# Patient Record
Sex: MALE | Race: BLACK OR AFRICAN AMERICAN | NOT HISPANIC OR LATINO | Employment: UNEMPLOYED | URBAN - METROPOLITAN AREA
[De-identification: names, ages, dates, MRNs, and addresses within clinical notes are randomized per-mention and may not be internally consistent; named-entity substitution may affect disease eponyms.]

---
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
## 2021-06-25 ENCOUNTER — OFFICE VISIT (OUTPATIENT)
Dept: FAMILY MEDICINE CLINIC | Facility: CLINIC | Age: 21
End: 2021-06-25

## 2021-06-25 VITALS
DIASTOLIC BLOOD PRESSURE: 70 MMHG | SYSTOLIC BLOOD PRESSURE: 132 MMHG | OXYGEN SATURATION: 100 % | WEIGHT: 267 LBS | HEIGHT: 71 IN | HEART RATE: 85 BPM | BODY MASS INDEX: 37.38 KG/M2

## 2021-06-25 DIAGNOSIS — J45.909 MILD ASTHMA WITHOUT COMPLICATION, UNSPECIFIED WHETHER PERSISTENT: ICD-10-CM

## 2021-06-25 DIAGNOSIS — Z13.29 SCREENING FOR THYROID DISORDER: ICD-10-CM

## 2021-06-25 DIAGNOSIS — T78.40XA ALLERGY, INITIAL ENCOUNTER: ICD-10-CM

## 2021-06-25 DIAGNOSIS — H61.23 BILATERAL IMPACTED CERUMEN: ICD-10-CM

## 2021-06-25 DIAGNOSIS — Z01.89 ROUTINE LAB DRAW: Primary | ICD-10-CM

## 2021-06-25 DIAGNOSIS — F90.9 ATTENTION DEFICIT HYPERACTIVITY DISORDER (ADHD), UNSPECIFIED ADHD TYPE: ICD-10-CM

## 2021-06-25 PROBLEM — T14.8XXA BROKEN BONES: Status: ACTIVE | Noted: 2021-06-25

## 2021-06-25 PROCEDURE — 99203 OFFICE O/P NEW LOW 30 MIN: CPT | Performed by: NURSE PRACTITIONER

## 2021-06-25 RX ORDER — MONTELUKAST SODIUM 10 MG/1
10 TABLET ORAL DAILY
Qty: 90 TABLET | Refills: 1 | Status: SHIPPED | OUTPATIENT
Start: 2021-06-25 | End: 2021-12-30 | Stop reason: SDUPTHER

## 2021-06-25 RX ORDER — CETIRIZINE HYDROCHLORIDE 10 MG/1
10 TABLET ORAL DAILY
COMMUNITY
End: 2023-01-05 | Stop reason: SDUPTHER

## 2021-06-25 RX ORDER — BUDESONIDE 1 MG/2ML
1 INHALANT ORAL
COMMUNITY
End: 2021-07-08 | Stop reason: SDUPTHER

## 2021-06-25 RX ORDER — ACETAMINOPHEN 500 MG
500 TABLET ORAL EVERY 6 HOURS PRN
COMMUNITY

## 2021-06-25 RX ORDER — TRAZODONE HYDROCHLORIDE 50 MG/1
50 TABLET ORAL
COMMUNITY
Start: 2021-05-25 | End: 2021-09-29 | Stop reason: SDUPTHER

## 2021-06-25 RX ORDER — DIPHENHYDRAMINE HCL 25 MG
25 CAPSULE ORAL NIGHTLY PRN
COMMUNITY

## 2021-06-25 RX ORDER — MONTELUKAST SODIUM 10 MG/1
10 TABLET ORAL DAILY
COMMUNITY
Start: 2021-05-22 | End: 2021-06-25 | Stop reason: SDUPTHER

## 2021-06-25 RX ORDER — METHYLPHENIDATE HYDROCHLORIDE 36 MG/1
72 TABLET ORAL EVERY MORNING
COMMUNITY
Start: 2021-05-25

## 2021-06-25 RX ORDER — BISMUTH SUBSALICYLATE 262 MG/1
524 TABLET, CHEWABLE ORAL DAILY PRN
COMMUNITY

## 2021-06-25 NOTE — PROGRESS NOTES
"Chief Complaint  Establish Care (New Patient)     Patient says he was patient of . States he has no cc just need medications refilled.     He has been seeing Kenneth Brooke for the Concerta. He was dx w/ADHD as a child. States his concentration and focus are good on the current dose. States he has been on the same dose for years.     seasoal allergies-under good control w/zyrtec and singulair.     Asthma-under good control w/pulmicort and singulair. He does have an albuterol inhaler but has never had to use it.         Subjective          Refugio Alvarez presents to [facility]  History of Present Illness    He  has no past medical history on file.     Objective   Vital Signs:   /70 (BP Location: Right arm, Patient Position: Sitting, Cuff Size: Adult)   Pulse 85   Ht 180.3 cm (71\")   Wt 121 kg (267 lb)   SpO2 100%   BMI 37.24 kg/m²     Physical Exam  Constitutional:       Appearance: Normal appearance.   HENT:      Head: Normocephalic and atraumatic.      Right Ear: There is impacted cerumen.      Left Ear: There is impacted cerumen.   Neck:      Thyroid: No thyroid mass, thyromegaly or thyroid tenderness.   Cardiovascular:      Rate and Rhythm: Normal rate and regular rhythm.      Pulses: Normal pulses.      Heart sounds: Normal heart sounds.   Pulmonary:      Effort: Pulmonary effort is normal.      Breath sounds: Normal breath sounds.   Skin:     General: Skin is warm and dry.   Neurological:      Mental Status: He is alert and oriented to person, place, and time.   Psychiatric:         Mood and Affect: Mood normal.         Behavior: Behavior normal.        Result Review :{Labs  Result Review  Imaging  Med Tab  Media  Procedures          Assessment and Plan    Diagnoses and all orders for this visit:    1. Routine lab draw (Primary)  -     CBC & Differential; Future  -     Lipid Panel; Future  -     Comprehensive Metabolic Panel; Future    2. Screening for " thyroid disorder  -     TSH; Future    3. Attention deficit hyperactivity disorder (ADHD), unspecified ADHD type    4. Allergy, initial encounter  -     montelukast (SINGULAIR) 10 MG tablet; Take 1 tablet by mouth Daily.  Dispense: 90 tablet; Refill: 1    5. Mild asthma without complication, unspecified whether persistent    6. Bilateral impacted cerumen  -     Ear Cerumen Removal        Follow Up   Return in about 6 months (around 12/25/2021).  Patient was given instructions and counseling regarding his condition or for health maintenance advice. Please see specific information pulled into the AVS if appropriate.     Refugio Alvarez  reports that he has never smoked. He has never used smokeless tobacco..

## 2021-07-08 DIAGNOSIS — T78.40XA ALLERGY, INITIAL ENCOUNTER: ICD-10-CM

## 2021-07-08 DIAGNOSIS — J45.909 MILD ASTHMA WITHOUT COMPLICATION, UNSPECIFIED WHETHER PERSISTENT: Primary | ICD-10-CM

## 2021-07-08 RX ORDER — BUDESONIDE 1 MG/2ML
1 INHALANT ORAL
Qty: 60 ML | Refills: 0 | Status: SHIPPED | OUTPATIENT
Start: 2021-07-08 | End: 2021-08-20 | Stop reason: SDUPTHER

## 2021-07-08 RX ORDER — MONTELUKAST SODIUM 10 MG/1
10 TABLET ORAL DAILY
Qty: 90 TABLET | Refills: 1 | Status: CANCELLED | OUTPATIENT
Start: 2021-07-08

## 2021-07-08 NOTE — TELEPHONE ENCOUNTER
Caller: Refugio Alvarez    Relationship: Self    Best call back number: [phone]    Medication needed:   Requested Prescriptions     Pending Prescriptions Disp Refills   • budesonide (PULMICORT) 1 MG/2ML nebulizer solution       Sig: Take 2 mL by nebulization Daily.   • montelukast (SINGULAIR) 10 MG tablet 90 tablet 1     Sig: Take 1 tablet by mouth Daily.       When do you need the refill by: 7-8-21    What additional details did the patient provide when requesting the medication: PATIENT IS COMPLETELY OUT OF THE PULMICORT SOLUTION. HAS A WEEK LEFT ON THE SINGULAIR.     Does the patient have less than a 3 day supply:  [x] Yes  [] No    What is the patient's preferred pharmacy: [facility] #[id number]  [place], [place] - [address] AT [facility] [phone] [facility] [phone] FX

## 2021-08-20 DIAGNOSIS — J45.909 MILD ASTHMA WITHOUT COMPLICATION, UNSPECIFIED WHETHER PERSISTENT: ICD-10-CM

## 2021-08-20 RX ORDER — BUDESONIDE 1 MG/2ML
1 INHALANT ORAL
Qty: 60 ML | Refills: 0 | Status: SHIPPED | OUTPATIENT
Start: 2021-08-20 | End: 2021-08-23

## 2021-08-23 RX ORDER — BUDESONIDE 1 MG/2ML
INHALANT ORAL
Qty: 180 ML | Refills: 0 | Status: SHIPPED | OUTPATIENT
Start: 2021-08-23 | End: 2021-12-29 | Stop reason: SDUPTHER

## 2021-09-29 NOTE — TELEPHONE ENCOUNTER
Caller: SUNIL GARCIA     Relationship: Father      Medication requested (name and dosage): traZODone (DESYREL) 50 MG tablet    Pharmacy where request should be sent: [facility] #[id number] - [place], [place] - [address] AT [facility] - [phone]  - [phone]   [phone]    Best call back number: [phone]    Does the patient have less than a 3 day supply:  [x] Yes  [] No        PATIENTS FATHER WOULD LIKE A CALL BACK TO LET HIM KNOW THIS HAS BEEN SENT TO THE PHARMACY PLEASE ADVISE THANK YOU

## 2021-09-30 RX ORDER — TRAZODONE HYDROCHLORIDE 50 MG/1
50 TABLET ORAL
Qty: 30 TABLET | Refills: 2 | Status: SHIPPED | OUTPATIENT
Start: 2021-09-30 | End: 2021-12-30 | Stop reason: SDUPTHER

## 2021-12-29 DIAGNOSIS — J45.909 MILD ASTHMA WITHOUT COMPLICATION, UNSPECIFIED WHETHER PERSISTENT: ICD-10-CM

## 2021-12-29 RX ORDER — BUDESONIDE 1 MG/2ML
1 INHALANT ORAL
Qty: 180 ML | Refills: 0 | Status: SHIPPED | OUTPATIENT
Start: 2021-12-29 | End: 2022-03-07

## 2021-12-29 NOTE — TELEPHONE ENCOUNTER
Caller: ivanna    Relationship: Father    Best call back number: [phone]    Requested Prescriptions:   Requested Prescriptions     Pending Prescriptions Disp Refills   • budesonide (PULMICORT) 1 MG/2ML nebulizer solution 180 mL 0        Pharmacy where request should be sent: [facility] #[id number] [facility] [place] - [address] [facility] AT [facility] [phone] [facility] [phone]      Additional details provided by patient:    Does the patient have less than a 3 day supply:  [x] Yes  [] No

## 2021-12-30 ENCOUNTER — TELEPHONE (OUTPATIENT)
Dept: FAMILY MEDICINE CLINIC | Facility: CLINIC | Age: 21
End: 2021-12-30

## 2021-12-30 DIAGNOSIS — G47.00 INSOMNIA, UNSPECIFIED TYPE: Primary | ICD-10-CM

## 2021-12-30 DIAGNOSIS — T78.40XA ALLERGY, INITIAL ENCOUNTER: ICD-10-CM

## 2021-12-30 RX ORDER — MONTELUKAST SODIUM 10 MG/1
10 TABLET ORAL DAILY
Qty: 90 TABLET | Refills: 1 | Status: SHIPPED | OUTPATIENT
Start: 2021-12-30 | End: 2021-12-31 | Stop reason: SDUPTHER

## 2021-12-30 NOTE — TELEPHONE ENCOUNTER
Caller: SUNIL GARCIA     Relationship: FATHER     Best call back number: [phone]    Requested Prescriptions:   Requested Prescriptions     Pending Prescriptions Disp Refills   • traZODone (DESYREL) 50 MG tablet 30 tablet 2     Sig: Take 1 tablet by mouth every night at bedtime.   • montelukast (SINGULAIR) 10 MG tablet 90 tablet 1     Sig: Take 1 tablet by mouth Daily.        Pharmacy where request should be sent: [facility] #[id number] [place], [place] - [facility] [address] [facility] AT [facility] [phone] [facility] [phone]      Additional details provided by patient: PATIENT HAS LESS THAN A 3 DAY SUPPLY AND IS REQUESTING A 90 DAY SUPPLY OF BOTH.     Does the patient have less than a 3 day supply:  [x] Yes  [] No    Sedrick Orta Rep   12/30/21 09:02 EST       SUNIL GARCIA IS ON THE  VERBAL.

## 2021-12-31 RX ORDER — MONTELUKAST SODIUM 10 MG/1
10 TABLET ORAL DAILY
Qty: 90 TABLET | Refills: 1 | Status: SHIPPED | OUTPATIENT
Start: 2021-12-31 | End: 2023-01-05 | Stop reason: SDUPTHER

## 2021-12-31 RX ORDER — TRAZODONE HYDROCHLORIDE 50 MG/1
50 TABLET ORAL
Qty: 30 TABLET | Refills: 2 | Status: SHIPPED | OUTPATIENT
Start: 2021-12-31 | End: 2022-07-05 | Stop reason: SDUPTHER

## 2022-01-05 ENCOUNTER — OFFICE VISIT (OUTPATIENT)
Dept: FAMILY MEDICINE CLINIC | Facility: CLINIC | Age: 22
End: 2022-01-05

## 2022-01-05 VITALS
HEIGHT: 71 IN | WEIGHT: 265.8 LBS | SYSTOLIC BLOOD PRESSURE: 118 MMHG | BODY MASS INDEX: 37.21 KG/M2 | DIASTOLIC BLOOD PRESSURE: 68 MMHG | HEART RATE: 88 BPM | OXYGEN SATURATION: 97 %

## 2022-01-05 DIAGNOSIS — G47.00 INSOMNIA, UNSPECIFIED TYPE: ICD-10-CM

## 2022-01-05 DIAGNOSIS — Z23 NEED FOR INFLUENZA VACCINATION: Primary | ICD-10-CM

## 2022-01-05 DIAGNOSIS — F90.9 ATTENTION DEFICIT HYPERACTIVITY DISORDER (ADHD), UNSPECIFIED ADHD TYPE: ICD-10-CM

## 2022-01-05 PROCEDURE — 90686 IIV4 VACC NO PRSV 0.5 ML IM: CPT | Performed by: NURSE PRACTITIONER

## 2022-01-05 PROCEDURE — 99212 OFFICE O/P EST SF 10 MIN: CPT | Performed by: NURSE PRACTITIONER

## 2022-01-05 PROCEDURE — 90471 IMMUNIZATION ADMIN: CPT | Performed by: NURSE PRACTITIONER

## 2022-01-05 NOTE — PROGRESS NOTES
"Chief Complaint  Immunizations, Insomnia, and ADHD    Subjective          Refugio Alvarez presents to [facility]  History of Present Illness     CYNTHIA- he takes zyrtec, benadryl and singular and pulmicort daily-reports his allergies are stable at this time.     ADHD-he is seeing Kenneth Brooke for management of ADHD. He was dx in elementary school. He is taking methylphenidate 36mg daily. States his focus and concentration are doing well on the current dose.    Insomnia-reports trazodone works well for his insomnia symptoms.    Denies refills at this time.    Due flu shot -done in the office today.     Covid vaccines and booster-Moderna    He has not had the HPV vaccine-information given at appt today.     He  has no past medical history on file.     Objective   Vital Signs:   /68 (BP Location: Left arm, Patient Position: Sitting)   Pulse 88   Ht 180.3 cm (71\")   Wt 121 kg (265 lb 12.8 oz)   SpO2 97%   BMI 37.07 kg/m²     Physical Exam  Constitutional:       Appearance: Normal appearance.   Neck:      Thyroid: No thyroid mass, thyromegaly or thyroid tenderness.      Vascular: No carotid bruit.   Cardiovascular:      Rate and Rhythm: Normal rate and regular rhythm.      Pulses: Normal pulses.      Heart sounds: Normal heart sounds.   Pulmonary:      Effort: Pulmonary effort is normal.      Breath sounds: Normal breath sounds.   Musculoskeletal:      Right lower leg: No edema.      Left lower leg: No edema.   Skin:     General: Skin is warm and dry.   Neurological:      General: No focal deficit present.      Mental Status: He is alert and oriented to person, place, and time.   Psychiatric:         Mood and Affect: Mood normal.         Behavior: Behavior normal.        Result Review :            History reviewed. No pertinent surgical history.   History reviewed. No pertinent family history.     Current Outpatient Medications:   •  acetaminophen (TYLENOL) 500 MG tablet, Take 500 mg " by mouth Every 6 (Six) Hours As Needed for Mild Pain ., Disp: , Rfl:   •  bismuth subsalicylate (PEPTO BISMOL) 262 MG chewable tablet, Chew 524 mg Daily As Needed for Indigestion., Disp: , Rfl:   •  budesonide (PULMICORT) 1 MG/2ML nebulizer solution, Take 2 mL by nebulization Daily., Disp: 180 mL, Rfl: 0  •  CBD (cannabidiol) oral oil, Take 1 drop by mouth Daily As Needed., Disp: , Rfl:   •  cetirizine (zyrTEC) 10 MG tablet, Take 10 mg by mouth Daily., Disp: , Rfl:   •  diphenhydrAMINE (BENADRYL) 25 mg capsule, Take 25 mg by mouth At Night As Needed for Itching., Disp: , Rfl:   •  methylphenidate 36 MG CR tablet, Take 72 mg by mouth Every Morning, Disp: , Rfl:   •  montelukast (SINGULAIR) 10 MG tablet, Take 1 tablet by mouth Daily., Disp: 90 tablet, Rfl: 1  •  traZODone (DESYREL) 50 MG tablet, Take 1 tablet by mouth every night at bedtime., Disp: 30 tablet, Rfl: 2   Assessment and Plan    Diagnoses and all orders for this visit:    1. Need for influenza vaccination (Primary)  -     FluLaval/Fluarix/Fluzone >6 Months    2. Insomnia, unspecified type  Comments:  reports trazodone works well for his insomnia symptoms.    3. Attention deficit hyperactivity disorder (ADHD), unspecified ADHD type  Comments:  focus and concentration are doing well on the current dose of Methylphenidate.        Follow Up   Return in about 6 months (around 7/5/2022).  Patient was given instructions and counseling regarding his condition or for health maintenance advice. Please see specific information pulled into the AVS if appropriate.     Refugio Alvarez  reports that he has never smoked. He has never used smokeless tobacco..         BRY Chau

## 2022-03-07 DIAGNOSIS — J45.909 MILD ASTHMA WITHOUT COMPLICATION, UNSPECIFIED WHETHER PERSISTENT: ICD-10-CM

## 2022-03-07 RX ORDER — BUDESONIDE 1 MG/2ML
INHALANT ORAL
Qty: 180 ML | Refills: 0 | Status: SHIPPED | OUTPATIENT
Start: 2022-03-07 | End: 2022-08-15

## 2022-07-05 ENCOUNTER — OFFICE VISIT (OUTPATIENT)
Dept: FAMILY MEDICINE CLINIC | Facility: CLINIC | Age: 22
End: 2022-07-05

## 2022-07-05 VITALS
HEART RATE: 89 BPM | WEIGHT: 257.8 LBS | BODY MASS INDEX: 36.09 KG/M2 | SYSTOLIC BLOOD PRESSURE: 120 MMHG | OXYGEN SATURATION: 99 % | DIASTOLIC BLOOD PRESSURE: 71 MMHG | HEIGHT: 71 IN

## 2022-07-05 DIAGNOSIS — Z00.00 ANNUAL PHYSICAL EXAM: ICD-10-CM

## 2022-07-05 DIAGNOSIS — H61.23 BILATERAL IMPACTED CERUMEN: ICD-10-CM

## 2022-07-05 DIAGNOSIS — Z23 NEED FOR HPV VACCINATION: Primary | ICD-10-CM

## 2022-07-05 DIAGNOSIS — G47.00 INSOMNIA, UNSPECIFIED TYPE: ICD-10-CM

## 2022-07-05 PROCEDURE — 69209 REMOVE IMPACTED EAR WAX UNI: CPT | Performed by: NURSE PRACTITIONER

## 2022-07-05 PROCEDURE — 90471 IMMUNIZATION ADMIN: CPT | Performed by: NURSE PRACTITIONER

## 2022-07-05 PROCEDURE — 90651 9VHPV VACCINE 2/3 DOSE IM: CPT | Performed by: NURSE PRACTITIONER

## 2022-07-05 PROCEDURE — 99213 OFFICE O/P EST LOW 20 MIN: CPT | Performed by: NURSE PRACTITIONER

## 2022-07-05 RX ORDER — TRAZODONE HYDROCHLORIDE 50 MG/1
50 TABLET ORAL
Qty: 90 TABLET | Refills: 1 | Status: SHIPPED | OUTPATIENT
Start: 2022-07-05 | End: 2023-01-05 | Stop reason: SDUPTHER

## 2022-07-05 NOTE — PROGRESS NOTES
"Chief Complaint  ADHD and Insomnia    Subjective          Refugio Alvarez presents to [facility]  History of Present Illness  Refugio Alvarez is a 22-year-old male who presents today for 6-month-follow-up. He has verbally consented for ZANE. He is accompanied by an adult female.    Insomnia - The patient states that trazodone is working well for his sleep.    ADHD - The patient confirms he is still seeing Kenneth Brooke. He confirms that he is taking Concerta, and it is working well.    Wellness - The patient agrees to getting his HPV vaccine today. He states he will have his tetanus vaccination updated at his next visit.    Patient Care Team:  Rachell Sanz APRN as PCP - General (Nurse Practitioner)   He  has no past medical history on file.     Objective   Vital Signs:   /71 (BP Location: Right arm, Patient Position: Sitting, Cuff Size: Adult)   Pulse 89   Ht 180.3 cm (71\")   Wt 117 kg (257 lb 12.8 oz)   SpO2 99%   BMI 35.96 kg/m²     Physical Exam  Vitals reviewed.   Constitutional:       Appearance: Normal appearance. He is well-developed.   HENT:      Ears:      Comments: Bilateral cerumen impaction.  Neck:      Thyroid: No thyromegaly.      Vascular: No carotid bruit.   Cardiovascular:      Rate and Rhythm: Normal rate and regular rhythm.      Heart sounds: No murmur heard.    No friction rub. No gallop.   Pulmonary:      Effort: Pulmonary effort is normal.      Breath sounds: Normal breath sounds. No wheezing or rhonchi.   Musculoskeletal:      Right lower leg: No edema.      Left lower leg: No edema.   Neurological:      Mental Status: He is alert and oriented to person, place, and time.      Cranial Nerves: No cranial nerve deficit.   Psychiatric:         Mood and Affect: Mood and affect normal.         Behavior: Behavior normal.         Thought Content: Thought content normal.         Judgment: Judgment normal.        Result Review :     No results were reviewed or " obtained today.    Cerumen Removal    Date/Time: 7/5/2022 10:23 AM  Performed by: Rhonda Teresa MA  Authorized by: Rachell Sanz APRN     Anesthesia:  Local Anesthetic: none  Location details: left ear and right ear  Patient tolerance: patient tolerated the procedure well with no immediate complications  Procedure type: irrigation   Sedation:  Patient sedated: no              History reviewed. No pertinent surgical history.   History reviewed. No pertinent family history.     Current Outpatient Medications:   •  traZODone (DESYREL) 50 MG tablet, Take 1 tablet by mouth every night at bedtime., Disp: 90 tablet, Rfl: 1  •  acetaminophen (TYLENOL) 500 MG tablet, Take 500 mg by mouth Every 6 (Six) Hours As Needed for Mild Pain ., Disp: , Rfl:   •  bismuth subsalicylate (PEPTO BISMOL) 262 MG chewable tablet, Chew 524 mg Daily As Needed for Indigestion., Disp: , Rfl:   •  budesonide (PULMICORT) 1 MG/2ML nebulizer solution, USE 2 ML VIA NEBULIZER DAILY, Disp: 180 mL, Rfl: 0  •  CBD (cannabidiol) oral oil, Take 1 drop by mouth Daily As Needed., Disp: , Rfl:   •  cetirizine (zyrTEC) 10 MG tablet, Take 10 mg by mouth Daily., Disp: , Rfl:   •  diphenhydrAMINE (BENADRYL) 25 mg capsule, Take 25 mg by mouth At Night As Needed for Itching., Disp: , Rfl:   •  methylphenidate 36 MG CR tablet, Take 72 mg by mouth Every Morning, Disp: , Rfl:   •  montelukast (SINGULAIR) 10 MG tablet, Take 1 tablet by mouth Daily., Disp: 90 tablet, Rfl: 1  No current facility-administered medications for this visit.   Assessment and Plan    Diagnoses and all orders for this visit:    1. Need for HPV vaccination (Primary)  Comments:  HPV vaccine today.  Orders:  -     HPV 9-Valent Recomb Vaccine suspension 0.5 mL    2. Insomnia, unspecified type  Comments:  Continue trazodone 50 mg each night.  Orders:  -     traZODone (DESYREL) 50 MG tablet; Take 1 tablet by mouth every night at bedtime.  Dispense: 90 tablet; Refill: 1    3. Annual physical  exam  -     CBC & Differential; Future  -     Vitamin B12; Future  -     Lipid Panel; Future  -     TSH; Future  -     Vitamin D 25 Hydroxy; Future  -     Comprehensive Metabolic Panel; Future  -     Urinalysis With Culture If Indicated -; Future  -     Comprehensive Metabolic Panel  -     Urinalysis With Culture If Indicated -  -     Vitamin D 25 Hydroxy  -     TSH  -     Lipid Panel  -     Vitamin B12  -     CBC & Differential    4. Bilateral impacted cerumen  Comments:  Bilateral ear irrigation today.  Orders:  -     Cerumen Removal  -     HPV 9-Valent Recomb Vaccine suspension 0.5 mL        Follow Up   Return in about 6 months (around 1/5/2023).  Patient was given instructions and counseling regarding his condition or for health maintenance advice. Please see specific information pulled into the AVS if appropriate.     Refugio Alvarez  reports that he has never smoked. He has never used smokeless tobacco..     The patient is advised to continue current medications.    Transcribed from ambient dictation for BRY Chau by Heather Paz.  07/05/22   12:09 EDT    Patient verbalized consent to the visit recording.

## 2022-08-15 DIAGNOSIS — J45.909 MILD ASTHMA WITHOUT COMPLICATION, UNSPECIFIED WHETHER PERSISTENT: ICD-10-CM

## 2022-08-15 RX ORDER — BUDESONIDE 1 MG/2ML
INHALANT ORAL
Qty: 180 ML | Refills: 0 | Status: SHIPPED | OUTPATIENT
Start: 2022-08-15

## 2022-12-28 DIAGNOSIS — T78.40XA ALLERGY, INITIAL ENCOUNTER: ICD-10-CM

## 2022-12-28 RX ORDER — MONTELUKAST SODIUM 10 MG/1
10 TABLET ORAL DAILY
Qty: 90 TABLET | Refills: 1 | OUTPATIENT
Start: 2022-12-28

## 2023-01-05 ENCOUNTER — OFFICE VISIT (OUTPATIENT)
Dept: FAMILY MEDICINE CLINIC | Facility: CLINIC | Age: 23
End: 2023-01-05
Payer: OTHER GOVERNMENT

## 2023-01-05 VITALS
HEART RATE: 105 BPM | DIASTOLIC BLOOD PRESSURE: 84 MMHG | BODY MASS INDEX: 36.37 KG/M2 | WEIGHT: 259.8 LBS | SYSTOLIC BLOOD PRESSURE: 128 MMHG | HEIGHT: 71 IN

## 2023-01-05 DIAGNOSIS — Z23 NEED FOR HPV VACCINATION: Primary | ICD-10-CM

## 2023-01-05 DIAGNOSIS — T78.40XA ALLERGY, INITIAL ENCOUNTER: ICD-10-CM

## 2023-01-05 DIAGNOSIS — G47.00 INSOMNIA, UNSPECIFIED TYPE: ICD-10-CM

## 2023-01-05 PROCEDURE — 90471 IMMUNIZATION ADMIN: CPT | Performed by: NURSE PRACTITIONER

## 2023-01-05 PROCEDURE — 90651 9VHPV VACCINE 2/3 DOSE IM: CPT | Performed by: NURSE PRACTITIONER

## 2023-01-05 PROCEDURE — 99213 OFFICE O/P EST LOW 20 MIN: CPT | Performed by: NURSE PRACTITIONER

## 2023-01-05 RX ORDER — TRAZODONE HYDROCHLORIDE 50 MG/1
50 TABLET ORAL
Qty: 90 TABLET | Refills: 3 | Status: SHIPPED | OUTPATIENT
Start: 2023-01-05

## 2023-01-05 RX ORDER — CETIRIZINE HYDROCHLORIDE 10 MG/1
10 TABLET ORAL DAILY
Qty: 90 TABLET | Refills: 3 | Status: SHIPPED | OUTPATIENT
Start: 2023-01-05

## 2023-01-05 RX ORDER — MONTELUKAST SODIUM 10 MG/1
10 TABLET ORAL DAILY
Qty: 90 TABLET | Refills: 3 | Status: SHIPPED | OUTPATIENT
Start: 2023-01-05 | End: 2023-01-09 | Stop reason: SDUPTHER

## 2023-01-05 NOTE — PROGRESS NOTES
Chief Complaint  seasonal allergies    SUBJECTIVE  Refugio Stone presents to [facility]    History of Present Illness     The patient presents today for a 6-month follow-up.    The patient is a senior in high school and will be doing sports. 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    The patient's blood pressure is good today at 128/84 mmHg. He needs a refill on his Singulair and Zyrtec. He is taking trazodone and feels that the dose is working well for him.    The patient sees Kenneth Brooke for his Concerta and he is scheduled to see him next week.    History reviewed. No pertinent past medical history.   History reviewed. No pertinent family history.   History reviewed. No pertinent surgical history.     Current Outpatient Medications:   •  cetirizine (zyrTEC) 10 MG tablet, Take 1 tablet by mouth Daily., Disp: 90 tablet, Rfl: 3  •  traZODone (DESYREL) 50 MG tablet, Take 1 tablet by mouth every night at bedtime., Disp: 90 tablet, Rfl: 3  •  acetaminophen (TYLENOL) 500 MG tablet, Take 500 mg by mouth Every 6 (Six) Hours As Needed for Mild Pain ., Disp: , Rfl:   •  bismuth subsalicylate (PEPTO BISMOL) 262 MG chewable tablet, Chew 524 mg Daily As Needed for Indigestion., Disp: , Rfl:   •  budesonide (PULMICORT) 1 MG/2ML nebulizer solution, USE 2 ML VIA NEBULIZER DAILY, Disp: 180 mL, Rfl: 0  •  CBD (cannabidiol) oral oil, Take 1 drop by mouth Daily As Needed., Disp: , Rfl:   •  diphenhydrAMINE (BENADRYL) 25 mg capsule, Take 25 mg by mouth At Night As Needed for Itching., Disp: , Rfl:   •  methylphenidate 36 MG CR tablet, Take 72 mg by mouth Every Morning, Disp: , Rfl:   •  montelukast (SINGULAIR) 10 MG tablet, Take 1 tablet by mouth Daily., Disp: 90 tablet, Rfl: 1    OBJECTIVE  Vital Signs:   /84 (BP Location: Left arm, Patient Position: Sitting, Cuff Size: Large Adult)   Pulse 105   Ht 180.3 cm (71\")   Wt 118 kg (259 lb 12.8 oz)   BMI 36.23 kg/m²     Estimated body mass index is 36.23 kg/m² as calculated from the following:    Height as of this encounter: 180.3 cm (71\").    Weight as of this encounter: 118 kg (259 lb 12.8 oz).     Wt Readings from Last 3 Encounters:   01/05/23 118 kg (259 lb 12.8 oz)   07/05/22 117 kg (257 lb 12.8 oz)   01/05/22 121 kg (265 lb 12.8 oz)     BP Readings from Last 3 Encounters:   01/05/23 128/84   07/05/22 120/71   01/05/22 118/68       Physical Exam  Vitals reviewed.   Constitutional:       Appearance: Normal appearance. He is well-developed.   Neck:      Thyroid: No thyromegaly.      Vascular: No carotid bruit.   Cardiovascular:      Rate and Rhythm: Normal rate and regular rhythm.      Heart sounds: No murmur heard.    No friction rub. No gallop.   Pulmonary:      Effort: Pulmonary effort is normal.      Breath sounds: Normal breath sounds. No wheezing or rhonchi.   Musculoskeletal:      Right lower leg: No edema.      Left lower leg: No edema.   Neurological:      Mental Status: He is alert and oriented to person, place, and time.      Cranial Nerves: No cranial nerve deficit.   Psychiatric:         Mood and Affect: Mood and affect normal.         Behavior: Behavior normal.         Thought Content: Thought content normal.         Judgment: Judgment normal.          Result Review        No Images in the past 120 days found..      The above data has been reviewed by BRY Chau 01/05/2023 11:22 EST.          Patient Care Team:  Rachell Sanz APRN as PCP - General (Nurse Practitioner)           ASSESSMENT & PLAN    Diagnoses and all orders for this visit:    1. Need for HPV vaccination (Primary)  Comments:  - He will receive his second HPV vaccine today.    Orders:  -     HPV 9-Valent Recomb Vaccine suspension 0.5 mL    2. Allergy, initial encounter  Comments:  -He will continue to take Singulair and Zyrtec.  Orders:  -     Discontinue: montelukast (SINGULAIR) 10 MG tablet; Take 1 tablet by mouth Daily.   Dispense: 90 tablet; Refill: 3  -     cetirizine (zyrTEC) 10 MG tablet; Take 1 tablet by mouth Daily.  Dispense: 90 tablet; Refill: 3    3. Insomnia, unspecified type  Comments:  Continue trazodone 50 mg each night.  Orders:  -     traZODone (DESYREL) 50 MG tablet; Take 1 tablet by mouth every night at bedtime.  Dispense: 90 tablet; Refill: 3         Tobacco Use: Low Risk    • Smoking Tobacco Use: Never   • Smokeless Tobacco Use: Never   • Passive Exposure: Not on file       Follow Up     Return in about 1 year (around 1/5/2024).          Patient was given instructions and counseling regarding his condition or for health maintenance advice. Please see specific information pulled into the AVS if appropriate.   I have reviewed information obtained and documented by others and I have confirmed the accuracy of this documented note.    BRY Chau      Transcribed from ambient dictation for BRY Chau by Niyah Delgado.  01/05/23   12:07 EST    Patient or patient representative verbalized consent to the visit recording.  I have personally performed the services described in this document as transcribed by the above individual, and it is both accurate and complete.

## 2023-01-09 DIAGNOSIS — T78.40XA ALLERGY, INITIAL ENCOUNTER: ICD-10-CM

## 2023-01-09 RX ORDER — MONTELUKAST SODIUM 10 MG/1
10 TABLET ORAL DAILY
Qty: 90 TABLET | Refills: 1 | Status: SHIPPED | OUTPATIENT
Start: 2023-01-09

## 2023-06-01 ENCOUNTER — OFFICE VISIT (OUTPATIENT)
Dept: FAMILY MEDICINE CLINIC | Facility: CLINIC | Age: 23
End: 2023-06-01

## 2023-06-01 VITALS
HEART RATE: 105 BPM | OXYGEN SATURATION: 97 % | BODY MASS INDEX: 37.63 KG/M2 | HEIGHT: 71 IN | SYSTOLIC BLOOD PRESSURE: 108 MMHG | WEIGHT: 268.8 LBS | DIASTOLIC BLOOD PRESSURE: 68 MMHG

## 2023-06-01 DIAGNOSIS — E55.9 VITAMIN D DEFICIENCY: ICD-10-CM

## 2023-06-01 DIAGNOSIS — Z11.59 ENCOUNTER FOR HEPATITIS C SCREENING TEST FOR LOW RISK PATIENT: ICD-10-CM

## 2023-06-01 DIAGNOSIS — R53.83 OTHER FATIGUE: ICD-10-CM

## 2023-06-01 DIAGNOSIS — Z13.29 SCREENING FOR THYROID DISORDER: ICD-10-CM

## 2023-06-01 DIAGNOSIS — T78.40XA ALLERGY, INITIAL ENCOUNTER: ICD-10-CM

## 2023-06-01 DIAGNOSIS — Z13.6 ENCOUNTER FOR LIPID SCREENING FOR CARDIOVASCULAR DISEASE: ICD-10-CM

## 2023-06-01 DIAGNOSIS — Z13.220 ENCOUNTER FOR LIPID SCREENING FOR CARDIOVASCULAR DISEASE: ICD-10-CM

## 2023-06-01 DIAGNOSIS — J45.909 MILD ASTHMA WITHOUT COMPLICATION, UNSPECIFIED WHETHER PERSISTENT: Primary | ICD-10-CM

## 2023-06-01 RX ORDER — MONTELUKAST SODIUM 10 MG/1
10 TABLET ORAL DAILY
Qty: 90 TABLET | Refills: 1 | Status: SHIPPED | OUTPATIENT
Start: 2023-06-01

## 2023-06-01 RX ORDER — BUDESONIDE 1 MG/2ML
1 INHALANT ORAL DAILY PRN
Qty: 180 ML | Refills: 0 | Status: SHIPPED | OUTPATIENT
Start: 2023-06-01

## 2023-06-01 NOTE — PROGRESS NOTES
"Chief Complaint  Establish Care    SUBJECTIVE  Refugio Alvarez presents to [facility]    History of Present Illness  23-year-old Augustus Alvarez presents today to establish care.  Patient was a previous patient of Rachell Sanz.    Patient is needing refills on Singulair and Pulmicort.  States that he uses the Pulmicort as needed.      Patient does have a history of ADHD but gets his medicines for this prescribed elsewhere.    He states that he has no other concerns at this time.    History reviewed. No pertinent past medical history.   History reviewed. No pertinent family history.   History reviewed. No pertinent surgical history.     Current Outpatient Medications:   •  acetaminophen (TYLENOL) 500 MG tablet, Take 1 tablet by mouth Every 6 (Six) Hours As Needed for Mild Pain., Disp: , Rfl:   •  bismuth subsalicylate (PEPTO BISMOL) 262 MG chewable tablet, Chew 2 tablets Daily As Needed for Indigestion., Disp: , Rfl:   •  budesonide (PULMICORT) 1 MG/2ML nebulizer solution, Take 2 mL by nebulization Daily As Needed (as needed)., Disp: 180 mL, Rfl: 0  •  CBD (cannabidiol) oral oil, Take 1 drop by mouth Daily As Needed., Disp: , Rfl:   •  cetirizine (zyrTEC) 10 MG tablet, Take 1 tablet by mouth Daily., Disp: 90 tablet, Rfl: 3  •  diphenhydrAMINE (BENADRYL) 25 mg capsule, Take 1 capsule by mouth At Night As Needed for Itching., Disp: , Rfl:   •  methylphenidate 36 MG CR tablet, Take 2 tablets by mouth Every Morning, Disp: , Rfl:   •  montelukast (SINGULAIR) 10 MG tablet, Take 1 tablet by mouth Daily., Disp: 90 tablet, Rfl: 1  •  traZODone (DESYREL) 50 MG tablet, Take 1 tablet by mouth every night at bedtime., Disp: 90 tablet, Rfl: 3    OBJECTIVE  Vital Signs:   /68 (BP Location: Left arm, Patient Position: Sitting, Cuff Size: Large Adult)   Pulse 105   Ht 180.3 cm (71\")   Wt 122 kg (268 lb 12.8 oz)   SpO2 97%   BMI 37.49 kg/m²    Estimated body mass index is 37.49 kg/m² as calculated " "from the following:    Height as of this encounter: 180.3 cm (71\").    Weight as of this encounter: 122 kg (268 lb 12.8 oz).     Wt Readings from Last 3 Encounters:   06/01/23 122 kg (268 lb 12.8 oz)   01/05/23 118 kg (259 lb 12.8 oz)   07/05/22 117 kg (257 lb 12.8 oz)     BP Readings from Last 3 Encounters:   06/01/23 108/68   01/05/23 128/84   07/05/22 120/71       Physical Exam     Result Review        No Images in the past 120 days found..      The above data has been reviewed by BRY Black 06/01/2023 15:43 EDT.          Patient Care Team:  Neyda Guillory APRN as PCP - General (Family Medicine)    Class 2 Severe Obesity (BMI >=35 and <=39.9). Obesity-related health conditions include the following: none. Obesity is worsening. BMI is is above average; BMI management plan is completed. We discussed portion control and increasing exercise.       ASSESSMENT & PLAN    Diagnoses and all orders for this visit:    1. Mild asthma without complication, unspecified whether persistent (Primary)  Comments:  Have refilled patient's Pulmicort and Singulair.  Orders:  -     budesonide (PULMICORT) 1 MG/2ML nebulizer solution; Take 2 mL by nebulization Daily As Needed (as needed).  Dispense: 180 mL; Refill: 0    2. Allergy, initial encounter  Comments:  He will continue on Singulair and Zyrtec.  Orders:  -     montelukast (SINGULAIR) 10 MG tablet; Take 1 tablet by mouth Daily.  Dispense: 90 tablet; Refill: 1    3. Encounter for lipid screening for cardiovascular disease  Comments:  Patient will have labs done when he is fasting.  Orders:  -     Lipid panel; Future    4. Screening for thyroid disorder    5. Vitamin D deficiency  -     Vitamin D 25 hydroxy; Future    6. Other fatigue  -     Vitamin B12; Future  -     TSH; Future  -     CBC w AUTO Differential; Future  -     Comprehensive metabolic panel; Future    7. Encounter for hepatitis C screening test for low risk patient  -     Hepatitis C antibody; " Future    Patient will have labs done when he is fasting.  We will look into reasons for fatigue such as vitamin D deficiency, B12 deficiency, TSH issues.  Patient will have lipid levels checked with labs to ensure that they are within normal.  Patient does have a history of asthma and we have refilled Pulmicort and Singulair.  He does get his Concerta and trazodone refilled elsewhere.  He does state that he has no concerns at this time.    Tobacco Use: Low Risk    • Smoking Tobacco Use: Never   • Smokeless Tobacco Use: Never   • Passive Exposure: Not on file       Follow Up     No follow-ups on file.      Patient was given instructions and counseling regarding his condition or for health maintenance advice. Please see specific information pulled into the AVS if appropriate.   I have reviewed information obtained and documented by others and I have confirmed the accuracy of this documented note.    BRY lBack

## 2023-06-14 ENCOUNTER — TELEPHONE (OUTPATIENT)
Dept: FAMILY MEDICINE CLINIC | Facility: CLINIC | Age: 23
End: 2023-06-14
Payer: COMMERCIAL

## 2023-12-15 ENCOUNTER — OFFICE VISIT (OUTPATIENT)
Dept: FAMILY MEDICINE CLINIC | Facility: CLINIC | Age: 23
End: 2023-12-15
Payer: COMMERCIAL

## 2023-12-15 VITALS
WEIGHT: 264 LBS | DIASTOLIC BLOOD PRESSURE: 92 MMHG | HEART RATE: 88 BPM | OXYGEN SATURATION: 97 % | BODY MASS INDEX: 36.96 KG/M2 | SYSTOLIC BLOOD PRESSURE: 114 MMHG | HEIGHT: 71 IN

## 2023-12-15 DIAGNOSIS — T78.40XA ALLERGY, INITIAL ENCOUNTER: ICD-10-CM

## 2023-12-15 DIAGNOSIS — Z23 NEED FOR INFLUENZA VACCINATION: ICD-10-CM

## 2023-12-15 DIAGNOSIS — Z23 NEED FOR HPV VACCINATION: ICD-10-CM

## 2023-12-15 DIAGNOSIS — Z76.89 ESTABLISHING CARE WITH NEW DOCTOR, ENCOUNTER FOR: Primary | ICD-10-CM

## 2023-12-15 PROBLEM — E66.9 OBESITY (BMI 30-39.9): Status: ACTIVE | Noted: 2023-12-15

## 2023-12-15 RX ORDER — MONTELUKAST SODIUM 10 MG/1
10 TABLET ORAL DAILY
Qty: 90 TABLET | Refills: 1 | Status: SHIPPED | OUTPATIENT
Start: 2023-12-15

## 2023-12-15 NOTE — PROGRESS NOTES
Subjective:       Refugio Alvarez is a 23 y.o. male with a concurrent medical history of obesity, intermittent asthma, allergic rhinitis, and ADHD who presents to establish care.      Mr. Alvarez has a documented concurrent medical history of asthma. Current medications include as needed Pulmicort nebulizer solution and Singulair 10 mg daily.    Asthma control test was administered today and patient scored a 24, indicating good control of his asthma. He reports using his nebulizer five times in the last four weeks.     Mr. Alvarez has a documented concurrent history of asthma.  Current medications include Zyrtec 10 mg and also Singulair which she also takes for asthma.  He also takes as needed Benadryl.  Today he tells me symptoms are well controlled.  He request refill of Singulair and denies symptoms of suicidal ideation on this medication.  Will refill for him at his request.    Mr. Alvarez has a documented medical history of ADHD.  He currently sees specialist for this who has prescribed methylphenidate and trazodone. Sees Dr. Kenneth Brooke.     Mr. Alvarez has BMI of 36.  Discussed lifestyle and dietary modifications.    Mr. Alvarez has history of impacted cerumen and requests ear flush today.  Performed in clinic.        The following portions of the patient's history were reviewed and updated as appropriate: allergies, current medications, past family history, past medical history, past social history, past surgical history, and problem list.    Past Medical Hx:  History reviewed. No pertinent past medical history.    Past Surgical Hx:  History reviewed. No pertinent surgical history.    Current Meds:    Current Outpatient Medications:     acetaminophen (TYLENOL) 500 MG tablet, Take 1 tablet by mouth Every 6 (Six) Hours As Needed for Mild Pain., Disp: , Rfl:     bismuth subsalicylate (PEPTO BISMOL) 262 MG chewable tablet, Chew 2 tablets Daily As Needed for Indigestion., Disp: , Rfl:     budesonide (PULMICORT) 1 MG/2ML  "nebulizer solution, Take 2 mL by nebulization Daily As Needed (as needed)., Disp: 180 mL, Rfl: 0    CBD (cannabidiol) oral oil, Take 1 drop by mouth Daily As Needed., Disp: , Rfl:     cetirizine (zyrTEC) 10 MG tablet, Take 1 tablet by mouth Daily., Disp: 90 tablet, Rfl: 3    diphenhydrAMINE (BENADRYL) 25 mg capsule, Take 1 capsule by mouth At Night As Needed for Itching., Disp: , Rfl:     methylphenidate 36 MG CR tablet, Take 2 tablets by mouth Every Morning, Disp: , Rfl:     montelukast (SINGULAIR) 10 MG tablet, Take 1 tablet by mouth Daily., Disp: 90 tablet, Rfl: 1    traZODone (DESYREL) 50 MG tablet, TAKE 1 TABLET BY MOUTH EVERY NIGHT AT BEDTIME, Disp: 90 tablet, Rfl: 3    Allergies:  No Known Allergies    Family Hx:  History reviewed. No pertinent family history.     Social History:  Social History     Socioeconomic History    Marital status: Single   Tobacco Use    Smoking status: Never    Smokeless tobacco: Never   Vaping Use    Vaping Use: Never used   Substance and Sexual Activity    Alcohol use: Never    Drug use: Never    Sexual activity: Defer       Review of Systems  Review of Systems   Constitutional:  Negative for chills and fever.   Respiratory:  Negative for shortness of breath and wheezing.    Gastrointestinal:  Negative for blood in stool, nausea and vomiting.   Genitourinary:  Negative for difficulty urinating and hematuria.   Neurological:  Negative for dizziness, syncope and weakness.   Psychiatric/Behavioral:  Negative for suicidal ideas.        Objective:     /92   Pulse 88   Ht 180.3 cm (71\")   Wt 120 kg (264 lb)   SpO2 97%   BMI 36.82 kg/m²   Physical Exam  Constitutional:       General: He is not in acute distress.     Appearance: Normal appearance. He is obese. He is not ill-appearing, toxic-appearing or diaphoretic.   HENT:      Head: Normocephalic.      Mouth/Throat:      Mouth: Mucous membranes are moist.      Pharynx: Oropharynx is clear. No oropharyngeal exudate or " posterior oropharyngeal erythema.   Eyes:      Extraocular Movements: Extraocular movements intact.      Pupils: Pupils are equal, round, and reactive to light.   Cardiovascular:      Rate and Rhythm: Normal rate and regular rhythm.   Pulmonary:      Effort: Pulmonary effort is normal.   Abdominal:      General: Bowel sounds are normal. There is no distension.      Palpations: Abdomen is soft.      Tenderness: There is no abdominal tenderness. There is no guarding.   Musculoskeletal:      Cervical back: Normal range of motion and neck supple.   Lymphadenopathy:      Cervical: No cervical adenopathy.   Neurological:      Mental Status: He is alert.   Psychiatric:         Mood and Affect: Mood normal.         Behavior: Behavior normal.          Assessment/Plan:     Diagnoses and all orders for this visit:    1. Establishing care with new doctor, encounter for (Primary)    2. Need for influenza vaccination  -     Fluzone (or Fluarix & Flulaval for VFC) >6mos    3. Need for HPV vaccination  -     HPV Vaccine (HPV9)    4. Allergy, initial encounter    Mr. Alvarez has a documented concurrent history of asthma.  Current medications include Zyrtec 10 mg and also Singulair which she also takes for asthma.  He also takes as needed Benadryl.  Today he tells me symptoms are well controlled.  He request refill of Singulair and denies symptoms of suicidal ideation on this medication.  Will refill for him at his request.    -     montelukast (SINGULAIR) 10 MG tablet; Take 1 tablet by mouth Daily.  Dispense: 90 tablet; Refill: 1          Rx changes: None    Follow-up:     Return in about 1 year (around 12/15/2024) for Annual physical.    Preventative:  Health Maintenance   Topic Date Due    HEPATITIS C SCREENING  Never done    TDAP/TD VACCINES (2 - Td or Tdap) 04/11/2022    ANNUAL PHYSICAL  07/05/2023    COVID-19 Vaccine (5 - 2023-24 season) 12/16/2023 (Originally 9/1/2023)    Pneumococcal Vaccine 0-64 (1 - PPSV23 or PCV20)  12/16/2023 (Originally 3/21/2007)    BMI FOLLOWUP  06/01/2024    INFLUENZA VACCINE  Completed    MENINGOCOCCAL VACCINE  Completed           This document has been electronically signed by Arcadio Andrews MD on December 15, 2023 12:06 EST       Parts of this note are electronic transcriptions/translations of spoken language to printed text using the Dragon Dictation system.

## 2023-12-30 DIAGNOSIS — T78.40XA ALLERGY, INITIAL ENCOUNTER: ICD-10-CM

## 2024-01-02 RX ORDER — CETIRIZINE HYDROCHLORIDE 10 MG/1
10 TABLET ORAL DAILY
Qty: 90 TABLET | Refills: 0 | Status: SHIPPED | OUTPATIENT
Start: 2024-01-02

## 2024-03-01 DIAGNOSIS — T78.40XA ALLERGY, INITIAL ENCOUNTER: ICD-10-CM

## 2024-03-01 RX ORDER — MONTELUKAST SODIUM 10 MG/1
10 TABLET ORAL DAILY
Qty: 90 TABLET | Refills: 1 | OUTPATIENT
Start: 2024-03-01

## 2024-03-11 DIAGNOSIS — T78.40XA ALLERGY, INITIAL ENCOUNTER: ICD-10-CM

## 2024-03-11 RX ORDER — CETIRIZINE HYDROCHLORIDE 10 MG/1
10 TABLET ORAL DAILY
Qty: 90 TABLET | Refills: 0 | Status: SHIPPED | OUTPATIENT
Start: 2024-03-11

## 2024-03-30 DIAGNOSIS — G47.00 INSOMNIA, UNSPECIFIED TYPE: ICD-10-CM

## 2024-04-01 RX ORDER — TRAZODONE HYDROCHLORIDE 50 MG/1
50 TABLET ORAL
Qty: 30 TABLET | Refills: 0 | OUTPATIENT
Start: 2024-04-01

## 2024-05-03 DIAGNOSIS — T78.40XA ALLERGY, INITIAL ENCOUNTER: ICD-10-CM

## 2024-05-03 RX ORDER — CETIRIZINE HYDROCHLORIDE 10 MG/1
10 TABLET ORAL DAILY
Qty: 90 TABLET | Refills: 0 | Status: SHIPPED | OUTPATIENT
Start: 2024-05-03

## 2024-08-01 DIAGNOSIS — G47.00 INSOMNIA, UNSPECIFIED TYPE: ICD-10-CM

## 2024-08-01 RX ORDER — TRAZODONE HYDROCHLORIDE 50 MG/1
50 TABLET ORAL
Qty: 90 TABLET | Refills: 3 | Status: SHIPPED | OUTPATIENT
Start: 2024-08-01

## 2024-08-01 NOTE — TELEPHONE ENCOUNTER
Caller: Refugio Alvarez    Relationship: Self    Best call back number: [phone]     Requested Prescriptions:   Requested Prescriptions     Pending Prescriptions Disp Refills    traZODone (DESYREL) 50 MG tablet 90 tablet 3     Sig: Take 1 tablet by mouth every night at bedtime.        Pharmacy where request should be sent: [id number] [place] [facility] [address] AT [facility] [phone] [facility] [phone]      Last office visit with prescribing clinician: 12/15/2023   Last telemedicine visit with prescribing clinician: Visit date not found   Next office visit with prescribing clinician: 12/17/2024       Does the patient have less than a 3 day supply:  [x] Yes  [] No    Would you like a call back once the refill request has been completed: [] Yes [x] No    If the office needs to give you a call back, can they leave a voicemail: [] Yes [x] No    Marisabel Radford, PCT   08/01/24 11:30 EDT

## 2024-08-01 NOTE — TELEPHONE ENCOUNTER
CALLED SPOKE WITH PT STATES ONLY MED DR. ROMERO FILLS IS methylphenidate. PT IS STILL NEEDING REFILL ON TRAZODONE PLEASE ADVISE?

## 2024-08-01 NOTE — TELEPHONE ENCOUNTER
I thought Mr. Alvarez received this medication prescription from Dr. Kenneth Brooke with psychiatry.  That will I put in my note at our last visit.  However, it does show that this was sent in by Neyda in July of last year.  Can we look into this?    Thank you,    Arcadio Andrews      This document has been electronically signed by Arcadio Andrews MD on August 1, 2024 14:03 EDT

## 2024-08-28 DIAGNOSIS — T78.40XA ALLERGY, INITIAL ENCOUNTER: ICD-10-CM

## 2024-08-29 RX ORDER — MONTELUKAST SODIUM 10 MG/1
10 TABLET ORAL DAILY
Qty: 90 TABLET | Refills: 1 | Status: SHIPPED | OUTPATIENT
Start: 2024-08-29

## 2024-11-11 ENCOUNTER — TELEPHONE (OUTPATIENT)
Dept: FAMILY MEDICINE CLINIC | Facility: CLINIC | Age: 24
End: 2024-11-11
Payer: COMMERCIAL

## 2024-11-11 NOTE — TELEPHONE ENCOUNTER
HUB TO SCHEDULE & RELAY:    Called patient to reschedule appt on 12/17/2024 @ 9:45 am due to provider being out of the office. REGAN

## 2024-11-12 NOTE — TELEPHONE ENCOUNTER
HUB TO SCHEDULE & RELAY:     2ND ATTEMPT: Called patient to reschedule appt on 12/17/2024 @ 9:45 am due to provider being out of the office. REGAN

## 2024-12-20 ENCOUNTER — TELEPHONE (OUTPATIENT)
Dept: FAMILY MEDICINE CLINIC | Facility: CLINIC | Age: 24
End: 2024-12-20

## 2024-12-20 NOTE — TELEPHONE ENCOUNTER
HUB TO RELAY AND SCHEDULE     Relay      Called patient due to provider being out of office today 12/20/2024. Patient is scheduled for today 12/20/2024 at 2:15 PM, M.

## 2024-12-27 ENCOUNTER — OFFICE VISIT (OUTPATIENT)
Dept: FAMILY MEDICINE CLINIC | Facility: CLINIC | Age: 24
End: 2024-12-27
Payer: COMMERCIAL

## 2024-12-27 VITALS
HEIGHT: 71 IN | WEIGHT: 262.4 LBS | BODY MASS INDEX: 36.73 KG/M2 | OXYGEN SATURATION: 97 % | DIASTOLIC BLOOD PRESSURE: 76 MMHG | HEART RATE: 90 BPM | SYSTOLIC BLOOD PRESSURE: 131 MMHG

## 2024-12-27 DIAGNOSIS — Z00.00 ANNUAL PHYSICAL EXAM: Primary | ICD-10-CM

## 2024-12-27 DIAGNOSIS — T78.40XA ALLERGY, INITIAL ENCOUNTER: ICD-10-CM

## 2024-12-27 DIAGNOSIS — E66.9 OBESITY (BMI 30-39.9): ICD-10-CM

## 2024-12-27 DIAGNOSIS — Z11.59 ENCOUNTER FOR HEPATITIS C SCREENING TEST FOR LOW RISK PATIENT: ICD-10-CM

## 2024-12-27 DIAGNOSIS — J45.909 MILD ASTHMA WITHOUT COMPLICATION, UNSPECIFIED WHETHER PERSISTENT: ICD-10-CM

## 2024-12-27 DIAGNOSIS — H61.23 BILATERAL IMPACTED CERUMEN: ICD-10-CM

## 2024-12-27 RX ORDER — BUDESONIDE 1 MG/2ML
1 INHALANT ORAL DAILY PRN
Qty: 180 ML | Refills: 0 | Status: SHIPPED | OUTPATIENT
Start: 2024-12-27

## 2024-12-27 RX ORDER — MONTELUKAST SODIUM 10 MG/1
10 TABLET ORAL DAILY
Qty: 90 TABLET | Refills: 1 | Status: SHIPPED | OUTPATIENT
Start: 2024-12-27

## 2024-12-27 RX ORDER — CETIRIZINE HYDROCHLORIDE 10 MG/1
10 TABLET ORAL DAILY
Qty: 90 TABLET | Refills: 0 | Status: SHIPPED | OUTPATIENT
Start: 2024-12-27

## 2024-12-27 NOTE — PROGRESS NOTES
Subjective:       Refugio Alvarez is a 24 y.o. male with a concurrent medical history of obesity, intermittent asthma, allergic rhinitis, and ADHD who presents for annual physical exam.    This is my second visit with Mr. Alvarez. Our first visit was 12/15/2023 in which he presented to establish care.    Mr. Alvarez has history of intermittent asthma.  He takes Singulair 10 mg daily and uses as needed Pulmicort nebulizer solution.    Mr. Alvarez has history of allergies.  Current medications include Zyrtec 10 mg and Singulair 10 mg.    Mr. Alvarez has history of ADHD and has seen Dr. Kenneth Brooke, who prescribes methylphenidate and trazodone.    Mr. Alvarez has history of obesity.  BMI 36.6. I cannot see that Mr. Alvarez has had any recent lab work.  I do see orders from 2 years ago that have .  We discussed potential to order labs today.  Counseled lifestyle and dietary modifications.  He says he will run 20 minutes nearly on a daily basis and I encouraged him to keep up the good work.    Last year, we treated Mr. Alvarez for cerumen impaction.  He reports that he has had buildup of earwax again.  Sure enough, on my physical exam he has significant cerumen impaction bilaterally.  Ear flush performed today in clinic.  I did let him know that it is okay to come in sooner than on a yearly basis if he requires cerumen impaction to be treated more frequently as I do have some patients that come in more frequently for this.  He expressed understanding and said if symptoms bother him again before next year he will come in sooner.  Otherwise, we will see back in 1 year for next physical or as needed if he develops any symptoms.      The following portions of the patient's history were reviewed and updated as appropriate: allergies, current medications, past family history, past medical history, past social history, past surgical history, and problem list.    Past Medical Hx:  No past medical history on file.    Past Surgical  "Hx:  No past surgical history on file.    Current Meds:    Current Outpatient Medications:     acetaminophen (TYLENOL) 500 MG tablet, Take 1 tablet by mouth Every 6 (Six) Hours As Needed for Mild Pain., Disp: , Rfl:     bismuth subsalicylate (PEPTO BISMOL) 262 MG chewable tablet, Chew 2 tablets Daily As Needed for Indigestion., Disp: , Rfl:     budesonide (PULMICORT) 1 MG/2ML nebulizer solution, Take 2 mL by nebulization Daily As Needed (as needed)., Disp: 180 mL, Rfl: 0    CBD (cannabidiol) oral oil, Take 1 drop by mouth Daily As Needed., Disp: , Rfl:     cetirizine (zyrTEC) 10 MG tablet, Take 1 tablet by mouth Daily., Disp: 90 tablet, Rfl: 0    diphenhydrAMINE (BENADRYL) 25 mg capsule, Take 1 capsule by mouth At Night As Needed for Itching., Disp: , Rfl:     methylphenidate 36 MG CR tablet, Take 2 tablets by mouth Every Morning, Disp: , Rfl:     montelukast (SINGULAIR) 10 MG tablet, Take 1 tablet by mouth Daily., Disp: 90 tablet, Rfl: 1    traZODone (DESYREL) 50 MG tablet, Take 1 tablet by mouth every night at bedtime., Disp: 90 tablet, Rfl: 3    Allergies:  No Known Allergies    Family Hx:  No family history on file.     Social History:  Social History     Socioeconomic History    Marital status: Single   Tobacco Use    Smoking status: Never    Smokeless tobacco: Never   Vaping Use    Vaping status: Never Used   Substance and Sexual Activity    Alcohol use: Never    Drug use: Never    Sexual activity: Defer       Review of Systems  Review of Systems   HENT:  Positive for ear discharge.    Psychiatric/Behavioral:  The patient is nervous/anxious.        Objective:     /76 (BP Location: Left arm, Patient Position: Sitting, Cuff Size: Large Adult)   Pulse 90   Ht 180.3 cm (71\")   Wt 119 kg (262 lb 6.4 oz)   SpO2 97%   BMI 36.60 kg/m²   Physical Exam  Constitutional:       General: He is not in acute distress.     Appearance: Normal appearance. He is obese. He is not ill-appearing, toxic-appearing or " diaphoretic.   HENT:      Right Ear: There is impacted cerumen.      Left Ear: There is impacted cerumen.   Pulmonary:      Effort: Pulmonary effort is normal. No respiratory distress.   Psychiatric:         Behavior: Behavior normal.          Assessment/Plan:     Diagnoses and all orders for this visit:    1. Annual physical exam (Primary)        3. Mild asthma without complication, unspecified whether persistent    Mr. Alvarez has history of intermittent asthma.  He takes Singulair 10 mg daily and uses as needed Pulmicort nebulizer solution.    -     budesonide (PULMICORT) 1 MG/2ML nebulizer solution; Take 2 mL by nebulization Daily As Needed (as needed).  Dispense: 180 mL; Refill: 0    4. Allergy, initial encounter    Mr. Alvarez has history of allergies.  Current medications include Zyrtec 10 mg and Singulair 10 mg.      -     cetirizine (zyrTEC) 10 MG tablet; Take 1 tablet by mouth Daily.  Dispense: 90 tablet; Refill: 0  -     montelukast (SINGULAIR) 10 MG tablet; Take 1 tablet by mouth Daily.  Dispense: 90 tablet; Refill: 1    5. Obesity (BMI 30-39.9)    Mr. Alvarez has history of obesity.  BMI 36.6. I cannot see that Mr. Alvarez has had any recent lab work.  I do see orders from 2 years ago that have .  We discussed potential to order labs today.  Counseled lifestyle and dietary modifications.  He says he will run 20 minutes nearly on a daily basis and I encouraged him to keep up the good work.    -     CBC No Differential; Future  -     Comprehensive metabolic panel; Future  -     Hemoglobin A1c; Future  -     TSH+Free T4; Future    6. Encounter for hepatitis C screening test for low risk patient  -     Hepatitis C antibody; Future    7. Bilateral impacted cerumen    Last year, we treated Mr. Alvarez for cerumen impaction.  He reports that he has had buildup of earwax again.  Sure enough, on my physical exam he has significant cerumen impaction bilaterally.  Ear flush performed today in clinic.  I did let him  know that it is okay to come in sooner than on a yearly basis if he requires cerumen impaction to be treated more frequently as I do have some patients that come in more frequently for this.  He expressed understanding and said if symptoms bother him again before next year he will come in sooner.  Otherwise, we will see back in 1 year for next physical or as needed if he develops any symptoms.      Follow-up:     Return in about 1 year (around 12/27/2025) for Annual physical.    Preventative:  Health Maintenance   Topic Date Due    HEPATITIS C SCREENING  Never done    ANNUAL PHYSICAL  07/05/2023    BMI FOLLOWUP  06/01/2024    INFLUENZA VACCINE  07/01/2024    COVID-19 Vaccine (5 - 2024-25 season) 03/18/2025 (Originally 9/1/2024)    Pneumococcal Vaccine 0-64 (1 of 1 - PPSV23 or PCV20) 12/27/2025 (Originally 3/21/2007)    TDAP/TD VACCINES (2 - Td or Tdap) 12/27/2025 (Originally 4/11/2022)           This document has been electronically signed by Arcadio Andrews MD on December 27, 2024 10:08 EST       Parts of this note are electronic transcriptions/translations of spoken language to printed text using the Dragon Dictation system.

## 2025-01-03 ENCOUNTER — CLINICAL SUPPORT (OUTPATIENT)
Dept: FAMILY MEDICINE CLINIC | Facility: CLINIC | Age: 25
End: 2025-01-03
Payer: COMMERCIAL

## 2025-01-03 DIAGNOSIS — Z23 NEED FOR INFLUENZA VACCINATION: Primary | ICD-10-CM

## 2025-01-03 PROCEDURE — 90656 IIV3 VACC NO PRSV 0.5 ML IM: CPT | Performed by: STUDENT IN AN ORGANIZED HEALTH CARE EDUCATION/TRAINING PROGRAM

## 2025-01-03 PROCEDURE — 90471 IMMUNIZATION ADMIN: CPT | Performed by: STUDENT IN AN ORGANIZED HEALTH CARE EDUCATION/TRAINING PROGRAM

## 2025-01-17 ENCOUNTER — LAB (OUTPATIENT)
Dept: LAB | Facility: HOSPITAL | Age: 25
End: 2025-01-17
Payer: COMMERCIAL

## 2025-01-17 DIAGNOSIS — Z11.59 ENCOUNTER FOR HEPATITIS C SCREENING TEST FOR LOW RISK PATIENT: ICD-10-CM

## 2025-01-17 DIAGNOSIS — E66.9 OBESITY (BMI 30-39.9): ICD-10-CM

## 2025-01-17 LAB
ALBUMIN SERPL-MCNC: 4 G/DL (ref 3.5–5.2)
ALBUMIN/GLOB SERPL: 1 G/DL
ALP SERPL-CCNC: 86 U/L (ref 39–117)
ALT SERPL W P-5'-P-CCNC: 21 U/L (ref 1–41)
ANION GAP SERPL CALCULATED.3IONS-SCNC: 11.7 MMOL/L (ref 5–15)
AST SERPL-CCNC: 16 U/L (ref 1–40)
BILIRUB SERPL-MCNC: 0.2 MG/DL (ref 0–1.2)
BUN SERPL-MCNC: 13 MG/DL (ref 6–20)
BUN/CREAT SERPL: 9.9 (ref 7–25)
CALCIUM SPEC-SCNC: 9.8 MG/DL (ref 8.6–10.5)
CHLORIDE SERPL-SCNC: 104 MMOL/L (ref 98–107)
CO2 SERPL-SCNC: 24.3 MMOL/L (ref 22–29)
CREAT SERPL-MCNC: 1.31 MG/DL (ref 0.76–1.27)
DEPRECATED RDW RBC AUTO: 40.4 FL (ref 37–54)
EGFRCR SERPLBLD CKD-EPI 2021: 78.4 ML/MIN/1.73
ERYTHROCYTE [DISTWIDTH] IN BLOOD BY AUTOMATED COUNT: 13.1 % (ref 12.3–15.4)
GLOBULIN UR ELPH-MCNC: 3.9 GM/DL
GLUCOSE SERPL-MCNC: 81 MG/DL (ref 65–99)
HBA1C MFR BLD: 5.2 % (ref 4.8–5.6)
HCT VFR BLD AUTO: 42 % (ref 37.5–51)
HCV AB SER QL: NORMAL
HGB BLD-MCNC: 13.9 G/DL (ref 13–17.7)
MCH RBC QN AUTO: 27.8 PG (ref 26.6–33)
MCHC RBC AUTO-ENTMCNC: 33.1 G/DL (ref 31.5–35.7)
MCV RBC AUTO: 84 FL (ref 79–97)
PLATELET # BLD AUTO: 322 10*3/MM3 (ref 140–450)
PMV BLD AUTO: 10 FL (ref 6–12)
POTASSIUM SERPL-SCNC: 4.4 MMOL/L (ref 3.5–5.2)
PROT SERPL-MCNC: 7.9 G/DL (ref 6–8.5)
RBC # BLD AUTO: 5 10*6/MM3 (ref 4.14–5.8)
SODIUM SERPL-SCNC: 140 MMOL/L (ref 136–145)
T4 FREE SERPL-MCNC: 1.05 NG/DL (ref 0.92–1.68)
TSH SERPL DL<=0.05 MIU/L-ACNC: 1.43 UIU/ML (ref 0.27–4.2)
WBC NRBC COR # BLD AUTO: 5.13 10*3/MM3 (ref 3.4–10.8)

## 2025-01-17 PROCEDURE — 83036 HEMOGLOBIN GLYCOSYLATED A1C: CPT

## 2025-01-17 PROCEDURE — 84439 ASSAY OF FREE THYROXINE: CPT

## 2025-01-17 PROCEDURE — 80050 GENERAL HEALTH PANEL: CPT

## 2025-01-17 PROCEDURE — 86803 HEPATITIS C AB TEST: CPT

## 2025-01-17 PROCEDURE — 36415 COLL VENOUS BLD VENIPUNCTURE: CPT

## 2025-01-20 DIAGNOSIS — R79.89 ELEVATED SERUM CREATININE: Primary | ICD-10-CM

## 2025-01-20 NOTE — PROGRESS NOTES
I have reviewed Augustus's lab work.  He had a very slightly elevated creatinine of 1.31.  This is an index of renal function.  Fortunately, BUN/creatinine ratio was normal and GFR was normal.  I do think it is worth repeating this lab in 3 months time to ensure normalization.  I have ordered this test today.  All other labs were normal.    Thank you,    Arcadio Andrews

## 2025-03-28 ENCOUNTER — TELEPHONE (OUTPATIENT)
Dept: FAMILY MEDICINE CLINIC | Facility: CLINIC | Age: 25
End: 2025-03-28

## 2025-03-28 NOTE — TELEPHONE ENCOUNTER
Caller: NGA GARCIA    Relationship: Mother    Best call back number: [phone]    What is the medical concern/diagnosis: DEPRESSION AND ADHD    What specialty or service is being requested: PSYCHIATRY     What is the provider, practice or medical service name: [facility]     What is the office location: [facility] [address] [place] [address]    What is the office phone number: [phone]    Any additional details: PATIENT'S MOTHER STATED PATIENT CURRENTLY HAS A REFERRAL TO PSYCHIATRY BUT THEY ONLY DO VIRTUAL VISITS. THEY WOULD LIKE FOR HIM TO GO SOMEWHERE HE CAN BE SEEN IN PERSON.

## 2025-03-31 DIAGNOSIS — F90.9 ATTENTION DEFICIT HYPERACTIVITY DISORDER (ADHD), UNSPECIFIED ADHD TYPE: Primary | ICD-10-CM

## 2025-03-31 NOTE — TELEPHONE ENCOUNTER
I believe he had been seeing Psychiatrist Dr. Kenneth Brooke who had been prescribing medications. Will place new referral at request.       This document has been electronically signed by Arcadio Andrews MD on March 31, 2025 15:45 EDT

## 2025-05-12 ENCOUNTER — TELEPHONE (OUTPATIENT)
Dept: FAMILY MEDICINE CLINIC | Facility: CLINIC | Age: 25
End: 2025-05-12
Payer: COMMERCIAL

## 2025-05-27 DIAGNOSIS — G47.00 INSOMNIA, UNSPECIFIED TYPE: ICD-10-CM

## 2025-05-27 RX ORDER — TRAZODONE HYDROCHLORIDE 50 MG/1
50 TABLET ORAL
Qty: 90 TABLET | Refills: 3 | Status: SHIPPED | OUTPATIENT
Start: 2025-05-27

## 2025-05-27 NOTE — TELEPHONE ENCOUNTER
Caller: Refugio Alvarez    Relationship: Self    Best call back number: [phone]     Requested Prescriptions:   Requested Prescriptions     Pending Prescriptions Disp Refills    traZODone (DESYREL) 50 MG tablet 90 tablet 3     Sig: Take 1 tablet by mouth every night at bedtime.        Pharmacy where request should be sent: [id number] [place] - [address] [facility] [phone] [facility] [phone]      Last office visit with prescribing clinician: 12/27/2024   Last telemedicine visit with prescribing clinician: Visit date not found   Next office visit with prescribing clinician: 12/29/2025     Does the patient have less than a 3 day supply:  [x] Yes  [] No    Would you like a call back once the refill request has been completed: [x] Yes [] No    If the office needs to give you a call back, can they leave a voicemail: [x] Yes [] No    Sedrick Cisse Rep   05/27/25 10:20 EDT

## 2025-06-05 DIAGNOSIS — T78.40XA ALLERGY, INITIAL ENCOUNTER: ICD-10-CM

## 2025-06-05 RX ORDER — MONTELUKAST SODIUM 10 MG/1
10 TABLET ORAL DAILY
Qty: 90 TABLET | Refills: 1 | Status: SHIPPED | OUTPATIENT
Start: 2025-06-05

## 2025-06-05 NOTE — TELEPHONE ENCOUNTER
Caller: Refugio Alvarez    Relationship: Self    Best call back number: [phone]     Requested Prescriptions:   Requested Prescriptions     Pending Prescriptions Disp Refills    montelukast (SINGULAIR) 10 MG tablet 90 tablet 1     Sig: Take 1 tablet by mouth Daily.        Pharmacy where request should be sent: [id number] [place] - [address] AT [facility] [phone] [facility] [phone]      Last office visit with prescribing clinician: 12/27/2024   Last telemedicine visit with prescribing clinician: Visit date not found   Next office visit with prescribing clinician: 12/29/2025       Does the patient have less than a 3 day supply:  [] Yes  [x] No    Would you like a call back once the refill request has been completed: [] Yes [] No    If the office needs to give you a call back, can they leave a voicemail: [] Yes [] No    Sedrick Ahn   06/05/25 10:18 EDT

## 2025-07-24 ENCOUNTER — TELEPHONE (OUTPATIENT)
Dept: FAMILY MEDICINE CLINIC | Facility: CLINIC | Age: 25
End: 2025-07-24
Payer: COMMERCIAL

## 2025-07-24 NOTE — TELEPHONE ENCOUNTER
Hub to relay & schedule:    Called patient to see if they would like to establish with another provider within the office due to Dr. Andrews leaving the office. LVM

## 2025-07-25 NOTE — TELEPHONE ENCOUNTER
Hub to relay & schedule:     Called patient to see if they would like to establish with another provider within the office due to Dr. Andrews leaving the office. VM FULL.

## 2025-07-25 NOTE — TELEPHONE ENCOUNTER
Hub to relay & schedule:     Called patient to see if they would like to establish with another provider within the office due to Dr. Andrews leaving the office. LVM x2

## 2025-08-15 ENCOUNTER — OFFICE VISIT (OUTPATIENT)
Dept: FAMILY MEDICINE CLINIC | Facility: CLINIC | Age: 25
End: 2025-08-15
Payer: COMMERCIAL

## 2025-08-15 VITALS
WEIGHT: 279 LBS | BODY MASS INDEX: 39.06 KG/M2 | OXYGEN SATURATION: 96 % | HEIGHT: 71 IN | DIASTOLIC BLOOD PRESSURE: 72 MMHG | SYSTOLIC BLOOD PRESSURE: 129 MMHG | HEART RATE: 89 BPM

## 2025-08-15 DIAGNOSIS — H61.23 BILATERAL IMPACTED CERUMEN: ICD-10-CM

## 2025-08-15 DIAGNOSIS — L91.8 CUTANEOUS SKIN TAGS: Primary | ICD-10-CM

## 2025-08-15 PROCEDURE — 99214 OFFICE O/P EST MOD 30 MIN: CPT | Performed by: STUDENT IN AN ORGANIZED HEALTH CARE EDUCATION/TRAINING PROGRAM

## 2025-08-15 RX ORDER — FLUOXETINE HYDROCHLORIDE 40 MG/1
40 CAPSULE ORAL EVERY MORNING
COMMUNITY

## 2025-08-15 RX ORDER — BUPROPION HYDROCHLORIDE 150 MG/1
150 TABLET ORAL DAILY
COMMUNITY
Start: 2025-06-27 | End: 2025-09-25